# Patient Record
Sex: FEMALE | Race: WHITE | HISPANIC OR LATINO | Employment: FULL TIME | URBAN - METROPOLITAN AREA
[De-identification: names, ages, dates, MRNs, and addresses within clinical notes are randomized per-mention and may not be internally consistent; named-entity substitution may affect disease eponyms.]

---

## 2019-09-29 ENCOUNTER — HOSPITAL ENCOUNTER (EMERGENCY)
Facility: HOSPITAL | Age: 29
Discharge: HOME | End: 2019-09-29
Attending: EMERGENCY MEDICINE | Admitting: EMERGENCY MEDICINE
Payer: COMMERCIAL

## 2019-09-29 ENCOUNTER — APPOINTMENT (EMERGENCY)
Dept: ULTRASOUND IMAGING | Facility: HOSPITAL | Age: 29
End: 2019-09-29
Payer: COMMERCIAL

## 2019-09-29 VITALS
TEMPERATURE: 98.1 F | DIASTOLIC BLOOD PRESSURE: 72 MMHG | HEIGHT: 59 IN | HEART RATE: 80 BPM | SYSTOLIC BLOOD PRESSURE: 130 MMHG | RESPIRATION RATE: 16 BRPM | OXYGEN SATURATION: 99 % | BODY MASS INDEX: 22.18 KG/M2 | WEIGHT: 110 LBS

## 2019-09-29 DIAGNOSIS — R10.2 PELVIC PAIN IN FEMALE: Primary | ICD-10-CM

## 2019-09-29 LAB
BILIRUB UR QL STRIP: NEGATIVE
CLARITY UR: CLEAR
COLOR UR: YELLOW
EXT PREG TEST URINE: NEGATIVE
EXT. CONTROL ED NAV: NORMAL
GLUCOSE UR STRIP-MCNC: NEGATIVE MG/DL
HGB UR QL STRIP.AUTO: NEGATIVE
KETONES UR STRIP-MCNC: ABNORMAL MG/DL
LEUKOCYTE ESTERASE UR QL STRIP: NEGATIVE
NITRITE UR QL STRIP: NEGATIVE
PH UR STRIP.AUTO: 6.5 [PH]
PROT UR STRIP-MCNC: NEGATIVE MG/DL
SP GR UR STRIP.AUTO: 1.01 (ref 1–1.03)
UROBILINOGEN UR QL STRIP.AUTO: 0.2 E.U./DL

## 2019-09-29 PROCEDURE — 99284 EMERGENCY DEPT VISIT MOD MDM: CPT

## 2019-09-29 PROCEDURE — 81025 URINE PREGNANCY TEST: CPT | Performed by: EMERGENCY MEDICINE

## 2019-09-29 PROCEDURE — 76856 US EXAM PELVIC COMPLETE: CPT

## 2019-09-29 PROCEDURE — 76830 TRANSVAGINAL US NON-OB: CPT

## 2019-09-29 PROCEDURE — 81003 URINALYSIS AUTO W/O SCOPE: CPT | Performed by: EMERGENCY MEDICINE

## 2019-09-29 NOTE — ED NOTES
US tech called for the need of the US non OB  Pt is drinking and is pending US at this time   Pt update on their care      Liang Saldana RN  09/29/19 5773

## 2019-09-29 NOTE — ED PROVIDER NOTES
History  Chief Complaint   Patient presents with    Pelvic Pain     started four days ago, bilateral lower abdomen, aching pain      Complains of gradual onset sharp crampy constant nonradiating bilateral pelvic pain sometimes worse with activity better with rest otherwise no significant exacerbating or relieving factors over the past 4 days or so  Denies fever, rash, joint pain/swelling, headache, vision changes, hearing changes, chest pain, shortness of breath and changes in bladder habits  None       History reviewed  No pertinent past medical history  Past Surgical History:   Procedure Laterality Date    CERVICAL CONE BIOPSY      TUBAL LIGATION         History reviewed  No pertinent family history  I have reviewed and agree with the history as documented  Social History     Tobacco Use    Smoking status: Never Smoker    Smokeless tobacco: Never Used   Substance Use Topics    Alcohol use: Never     Frequency: Never    Drug use: Never        Review of Systems   All other systems reviewed and are negative  Physical Exam  Physical Exam   Constitutional: She appears well-developed and well-nourished  HENT:   Head: Normocephalic and atraumatic  Eyes: Conjunctivae and EOM are normal    Neck: Normal range of motion  Neck supple  Cardiovascular: Normal rate and regular rhythm  Pulmonary/Chest: Effort normal and breath sounds normal    Abdominal: Soft  There is no tenderness  Musculoskeletal: Normal range of motion  She exhibits no deformity  Neurological: She is alert  No focal deficit   Skin: Skin is warm and dry  Psychiatric: She has a normal mood and affect  Her behavior is normal    Nursing note and vitals reviewed        Vital Signs  ED Triage Vitals [09/29/19 1352]   Temperature Pulse Respirations Blood Pressure SpO2   98 1 °F (36 7 °C) 83 16 134/79 100 %      Temp Source Heart Rate Source Patient Position - Orthostatic VS BP Location FiO2 (%)   Temporal Monitor Sitting Left arm --      Pain Score       5           Vitals:    09/29/19 1352   BP: 134/79   Pulse: 83   Patient Position - Orthostatic VS: Sitting         Visual Acuity      ED Medications  Medications - No data to display    Diagnostic Studies  Results Reviewed     Procedure Component Value Units Date/Time    UA w Reflex to Microscopic w Reflex to Culture [[id number]]  (Abnormal) Collected:  09/29/19 1536    Lab Status:  Final result Specimen:  Urine, Clean Catch Updated:  09/29/19 1544     Color, UA Yellow     Clarity, UA Clear     Specific Gravity, UA 1 015     pH, UA 6 5     Leukocytes, UA Negative     Nitrite, UA Negative     Protein, UA Negative mg/dl      Glucose, UA Negative mg/dl      Ketones, UA 15 (1+) mg/dl      Urobilinogen, UA 0 2 E U /dl      Bilirubin, UA Negative     Blood, UA Negative    POCT pregnancy, urine [[id number]]  (Normal) Resulted:  09/29/19 1540    Lab Status:  Final result Updated:  09/29/19 1541     EXT PREG TEST UR (Ref: Negative) negative     Control valid                 US pelvis complete w transvaginal   Final Result by Aidan Malik MD (09/29 1543)       Normal study with normal flow to both ovaries  Workstation performed: [id number]                    Procedures  Procedures       ED Course                               MDM  Number of Diagnoses or Management Options  Diagnosis management comments: Presents with pelvic pain  Afebrile  VSS  Exam reveals nontoxic appearing patient in no acute distress with a benign abdomen  UA unremarkable  UCG negative  Diagnostic imaging reveals no ultrasound evidence suggesting acute pelvic process to explain the patient's symptoms  No clear etiology at this time  Consider constipation  Consider some form of late britelianne  Regardless, no red flags or high-risk features of time of this report    Okay for discharge home with instructions for gynecology follow-up and signs and symptoms that would warrant return to the emergency department  Disposition  Final diagnoses:   Pelvic pain in female     Time reflects when diagnosis was documented in both MDM as applicable and the Disposition within this note     Time User Action Codes Description Comment    9/29/2019  4:00 PM Harlo Fothergill Add [R10 2] Pelvic pain in female       ED Disposition     ED Disposition Condition Date/Time Comment    Discharge Good Sun Sep 29, 2019  4:00 PM Dagoberto Kaur discharge to home/self care  Follow-up Information     Follow up With Specialties Details Why Contact Info    A gynecologist of your choice  Call in 1 day To schedule an appointment for re-evaluation           Patient's Medications    No medications on file     No discharge procedures on file      ED Provider  Electronically Signed by           Selina Johnson DO  09/29/19 3824

## 2019-11-19 ENCOUNTER — OFFICE VISIT (OUTPATIENT)
Dept: URGENT CARE | Facility: CLINIC | Age: 29
End: 2019-11-19
Payer: COMMERCIAL

## 2019-11-19 VITALS
HEART RATE: 87 BPM | HEIGHT: 59 IN | OXYGEN SATURATION: 100 % | DIASTOLIC BLOOD PRESSURE: 79 MMHG | WEIGHT: 110 LBS | SYSTOLIC BLOOD PRESSURE: 132 MMHG | TEMPERATURE: 97.2 F | BODY MASS INDEX: 22.18 KG/M2 | RESPIRATION RATE: 16 BRPM

## 2019-11-19 DIAGNOSIS — L30.9 HAND ECZEMA: ICD-10-CM

## 2019-11-19 DIAGNOSIS — B35.9 RINGWORM: Primary | ICD-10-CM

## 2019-11-19 PROCEDURE — 99213 OFFICE O/P EST LOW 20 MIN: CPT | Performed by: PHYSICIAN ASSISTANT

## 2019-11-19 RX ORDER — NORELGESTROMIN AND ETHINYL ESTRADIOL 150; 35 UG/D; UG/D
PATCH TRANSDERMAL
Refills: 12 | COMMUNITY
Start: 2019-10-26 | End: 2020-10-06 | Stop reason: ALTCHOICE

## 2019-11-19 RX ORDER — KETOCONAZOLE 20 MG/G
CREAM TOPICAL 3 TIMES DAILY
Qty: 60 G | Refills: 1 | Status: SHIPPED | OUTPATIENT
Start: 2019-11-19 | End: 2020-10-06 | Stop reason: ALTCHOICE

## 2019-11-19 RX ORDER — TRIAMCINOLONE ACETONIDE 1 MG/G
CREAM TOPICAL 3 TIMES DAILY
Qty: 80 G | Refills: 0 | Status: SHIPPED | OUTPATIENT
Start: 2019-11-19 | End: 2020-10-06 | Stop reason: ALTCHOICE

## 2019-11-20 NOTE — PROGRESS NOTES
[facility]    NAME: Kelly Mendoza is a 34 y o  female  : 1990    MRN: [id number]  DATE: 2019  TIME: 7:43 PM    Assessment and Plan   Ringworm [B35 9]  1  Ringworm  ketoconazole (NIZORAL) 2 % cream   2  Hand eczema  triamcinolone (KENALOG) 0 1 % cream       Patient Instructions     Patient Instructions   Creams as directed  Follow up with dermatology if not improving  Chief Complaint     Chief Complaint   Patient presents with    Rash     x 6 weeks       History of Present Illness   21-year-old female here with complaint of rash on bilateral forearms and right axilla the last 6 weeks  States is very itchy  Patient has been applying over-the-counter antifungal cream with minimal relief  Also has dermatitis of her hands  States that she is in water a lot for her job  Also very dry and itchy  Review of Systems   Review of Systems   Constitutional: Negative for appetite change, chills and fever  HENT: Negative for congestion, ear discharge, ear pain, facial swelling, postnasal drip, sinus pressure, sneezing and sore throat  Respiratory: Negative for cough, shortness of breath and wheezing  Skin: Positive for rash  Neurological: Negative for headaches         Current Medications     Current Outpatient Medications:     XULANE 150-35 MCG/24HR, APPLY 1 PATCH ONE TIME PER WEEK, Disp: , Rfl: 12    ketoconazole (NIZORAL) 2 % cream, Apply topically 3 (three) times a day, Disp: 60 g, Rfl: 1    norelgestromin-ethinyl estradiol (ORTHO EVRA) 150-35 MCG/24HR, Place 1 patch on the skin, Disp: , Rfl:     triamcinolone (KENALOG) 0 1 % cream, Apply topically 3 (three) times a day, Disp: 80 g, Rfl: 0    Current Allergies     Allergies as of 2019    (No Known Allergies)          The following portions of the patient's history were reviewed and updated as appropriate: allergies, current medications, past family history, past medical history, past social history, past surgical history and problem list    History reviewed  No pertinent past medical history  Past Surgical History:   Procedure Laterality Date    CERVICAL CONE BIOPSY      TUBAL LIGATION       History reviewed  No pertinent family history  Social History     Socioeconomic History    Marital status: /Civil Union     Spouse name: Not on file    Number of children: Not on file    Years of education: Not on file    Highest education level: Not on file   Occupational History    Not on file   Social Needs    Financial resource strain: Not on file    Food insecurity:     Worry: Not on file     Inability: Not on file    Transportation needs:     Medical: Not on file     Non-medical: Not on file   Tobacco Use    Smoking status: Never Smoker    Smokeless tobacco: Never Used   Substance and Sexual Activity    Alcohol use: Never     Frequency: Never    Drug use: Never    Sexual activity: Not on file   Lifestyle    Physical activity:     Days per week: Not on file     Minutes per session: Not on file    Stress: Not on file   Relationships    Social connections:     Talks on phone: Not on file     Gets together: Not on file     Attends Restorationist service: Not on file     Active member of club or organization: Not on file     Attends meetings of clubs or organizations: Not on file     Relationship status: Not on file    Intimate partner violence:     Fear of current or ex partner: Not on file     Emotionally abused: Not on file     Physically abused: Not on file     Forced sexual activity: Not on file   Other Topics Concern    Not on file   Social History Narrative    Not on file     Medications have been verified  Objective   /79   Pulse 87   Temp (!) 97 2 °F (36 2 °C)   Resp 16   Ht 4' 11" (1 499 m)   Wt 49 9 kg (110 lb)   SpO2 100%   BMI 22 22 kg/m²      Physical Exam   Physical Exam   Constitutional: She appears well-developed and well-nourished  No distress     HENT:   Head: Normocephalic and atraumatic  Right Ear: Tympanic membrane normal    Left Ear: Tympanic membrane normal    Nose: Nose normal  No mucosal edema or rhinorrhea  Right sinus exhibits no maxillary sinus tenderness and no frontal sinus tenderness  Left sinus exhibits no maxillary sinus tenderness and no frontal sinus tenderness  Mouth/Throat: Oropharynx is clear and moist  No oropharyngeal exudate, posterior oropharyngeal edema or posterior oropharyngeal erythema  Eyes: Conjunctivae are normal    Cardiovascular: Normal rate, regular rhythm and normal heart sounds  No murmur heard  Skin: Rash (Erythematous circular rash bilateral forearms on the extensor surfaces circular in nature indurated border  Central clearing ) noted  Bilateral hands with dry erythematous rash  Nursing note and vitals reviewed

## 2020-02-11 ENCOUNTER — OFFICE VISIT (OUTPATIENT)
Dept: FAMILY MEDICINE CLINIC | Facility: CLINIC | Age: 30
End: 2020-02-11
Payer: COMMERCIAL

## 2020-02-11 VITALS
SYSTOLIC BLOOD PRESSURE: 118 MMHG | HEIGHT: 59 IN | RESPIRATION RATE: 18 BRPM | DIASTOLIC BLOOD PRESSURE: 82 MMHG | TEMPERATURE: 97.1 F | BODY MASS INDEX: 22.3 KG/M2 | HEART RATE: 96 BPM | WEIGHT: 110.6 LBS

## 2020-02-11 DIAGNOSIS — F32.0 CURRENT MILD EPISODE OF MAJOR DEPRESSIVE DISORDER WITHOUT PRIOR EPISODE (HCC): ICD-10-CM

## 2020-02-11 DIAGNOSIS — Z76.89 ENCOUNTER TO ESTABLISH CARE: Primary | ICD-10-CM

## 2020-02-11 PROCEDURE — 99203 OFFICE O/P NEW LOW 30 MIN: CPT | Performed by: FAMILY MEDICINE

## 2020-02-11 RX ORDER — ESCITALOPRAM OXALATE 10 MG/1
10 TABLET ORAL DAILY
Qty: 30 TABLET | Refills: 1 | Status: SHIPPED | OUTPATIENT
Start: 2020-02-11 | End: 2020-10-06 | Stop reason: ALTCHOICE

## 2020-02-11 RX ORDER — ESCITALOPRAM OXALATE 20 MG/1
20 TABLET ORAL DAILY
Status: CANCELLED | OUTPATIENT
Start: 2020-02-11

## 2020-04-02 DIAGNOSIS — F32.0 CURRENT MILD EPISODE OF MAJOR DEPRESSIVE DISORDER WITHOUT PRIOR EPISODE (HCC): ICD-10-CM

## 2020-04-03 RX ORDER — ESCITALOPRAM OXALATE 10 MG/1
10 TABLET ORAL DAILY
Qty: 30 TABLET | Refills: 1 | OUTPATIENT
Start: 2020-04-03

## 2020-04-09 DIAGNOSIS — F32.0 CURRENT MILD EPISODE OF MAJOR DEPRESSIVE DISORDER WITHOUT PRIOR EPISODE (HCC): ICD-10-CM

## 2020-04-09 RX ORDER — ESCITALOPRAM OXALATE 10 MG/1
10 TABLET ORAL DAILY
Qty: 30 TABLET | Refills: 1 | OUTPATIENT
Start: 2020-04-09

## 2020-04-14 ENCOUNTER — APPOINTMENT (EMERGENCY)
Dept: RADIOLOGY | Facility: HOSPITAL | Age: 30
End: 2020-04-14
Payer: COMMERCIAL

## 2020-04-14 ENCOUNTER — HOSPITAL ENCOUNTER (EMERGENCY)
Facility: HOSPITAL | Age: 30
Discharge: HOME | End: 2020-04-15
Attending: EMERGENCY MEDICINE | Admitting: EMERGENCY MEDICINE
Payer: COMMERCIAL

## 2020-04-14 VITALS
HEIGHT: 59 IN | SYSTOLIC BLOOD PRESSURE: 120 MMHG | HEART RATE: 99 BPM | DIASTOLIC BLOOD PRESSURE: 69 MMHG | RESPIRATION RATE: 18 BRPM | OXYGEN SATURATION: 97 % | BODY MASS INDEX: 23.82 KG/M2 | WEIGHT: 118.17 LBS | TEMPERATURE: 98.7 F

## 2020-04-14 DIAGNOSIS — B34.9 ACUTE VIRAL SYNDROME: Primary | ICD-10-CM

## 2020-04-14 LAB — S PYO DNA THROAT QL NAA+PROBE: NORMAL

## 2020-04-14 PROCEDURE — 87651 STREP A DNA AMP PROBE: CPT | Performed by: EMERGENCY MEDICINE

## 2020-04-14 PROCEDURE — 99284 EMERGENCY DEPT VISIT MOD MDM: CPT | Performed by: EMERGENCY MEDICINE

## 2020-04-14 PROCEDURE — 87635 SARS-COV-2 COVID-19 AMP PRB: CPT | Performed by: EMERGENCY MEDICINE

## 2020-04-14 PROCEDURE — 93005 ELECTROCARDIOGRAM TRACING: CPT

## 2020-04-14 PROCEDURE — 71045 X-RAY EXAM CHEST 1 VIEW: CPT

## 2020-04-14 PROCEDURE — 99285 EMERGENCY DEPT VISIT HI MDM: CPT

## 2020-04-15 LAB
ATRIAL RATE: 94 BPM
P AXIS: 68 DEGREES
PR INTERVAL: 160 MS
QRS AXIS: 75 DEGREES
QRSD INTERVAL: 86 MS
QT INTERVAL: 358 MS
QTC INTERVAL: 447 MS
T WAVE AXIS: 39 DEGREES
VENTRICULAR RATE: 94 BPM

## 2020-04-15 PROCEDURE — 93010 ELECTROCARDIOGRAM REPORT: CPT | Performed by: INTERNAL MEDICINE

## 2020-04-16 LAB — SARS-COV-2 RNA SPEC QL NAA+PROBE: DETECTED

## 2020-10-06 ENCOUNTER — OFFICE VISIT (OUTPATIENT)
Dept: FAMILY MEDICINE CLINIC | Facility: CLINIC | Age: 30
End: 2020-10-06
Payer: COMMERCIAL

## 2020-10-06 VITALS
TEMPERATURE: 97.6 F | WEIGHT: 114 LBS | SYSTOLIC BLOOD PRESSURE: 128 MMHG | HEIGHT: 59 IN | BODY MASS INDEX: 22.98 KG/M2 | HEART RATE: 88 BPM | DIASTOLIC BLOOD PRESSURE: 86 MMHG | OXYGEN SATURATION: 99 %

## 2020-10-06 DIAGNOSIS — L65.9 ALOPECIA: ICD-10-CM

## 2020-10-06 DIAGNOSIS — F32.1 CURRENT MODERATE EPISODE OF MAJOR DEPRESSIVE DISORDER, UNSPECIFIED WHETHER RECURRENT (HCC): Primary | ICD-10-CM

## 2020-10-06 PROCEDURE — 99213 OFFICE O/P EST LOW 20 MIN: CPT | Performed by: FAMILY MEDICINE

## 2021-04-20 ENCOUNTER — HOSPITAL ENCOUNTER (EMERGENCY)
Facility: HOSPITAL | Age: 31
Discharge: HOME | End: 2021-04-20
Attending: EMERGENCY MEDICINE
Payer: COMMERCIAL

## 2021-04-20 ENCOUNTER — APPOINTMENT (EMERGENCY)
Dept: RADIOLOGY | Facility: HOSPITAL | Age: 31
End: 2021-04-20
Payer: COMMERCIAL

## 2021-04-20 ENCOUNTER — APPOINTMENT (EMERGENCY)
Dept: CT IMAGING | Facility: HOSPITAL | Age: 31
End: 2021-04-20
Payer: COMMERCIAL

## 2021-04-20 VITALS
BODY MASS INDEX: 23.07 KG/M2 | OXYGEN SATURATION: 96 % | SYSTOLIC BLOOD PRESSURE: 110 MMHG | HEART RATE: 84 BPM | DIASTOLIC BLOOD PRESSURE: 72 MMHG | RESPIRATION RATE: 18 BRPM | TEMPERATURE: 98.2 F | WEIGHT: 114.2 LBS

## 2021-04-20 DIAGNOSIS — S09.90XA CHI (CLOSED HEAD INJURY): Primary | ICD-10-CM

## 2021-04-20 PROCEDURE — 99284 EMERGENCY DEPT VISIT MOD MDM: CPT | Performed by: EMERGENCY MEDICINE

## 2021-04-20 PROCEDURE — 71045 X-RAY EXAM CHEST 1 VIEW: CPT

## 2021-04-20 PROCEDURE — 99284 EMERGENCY DEPT VISIT MOD MDM: CPT

## 2021-04-20 PROCEDURE — 73030 X-RAY EXAM OF SHOULDER: CPT

## 2021-04-20 PROCEDURE — 70450 CT HEAD/BRAIN W/O DYE: CPT

## 2021-04-21 NOTE — ED NOTES
There was a 90lb bin that contains water in it that she was dumping water out of and when she went to dump it, it came back and hit her in the right posterior head and right shoulder  She did have dizziness at first  It happened around 17:00 this evening  She went home and is now having a headache and lightheadedness that will not go away       Abner Roche RN  04/20/21 2024

## 2021-04-21 NOTE — ED PROVIDER NOTES
History  Chief Complaint   Patient presents with    Head Injury     Patient was hit in back of head and shoulder with a "bin" at work that she states are heavy  Occured around 1700  She did not lose consciousness or fall  Now c/o R shoulder pain and headache  HPI  This is a 72-year-old female who presents for evaluation of head injury  Patient was at work tonight when she got hit the back of the head with a been at work  She now has pain in the back of her head, she has pain in her right shoulder  She denies any loss consciousness  She states that the injury happened at approximately 17:00 today  She thought that it would get better but her headache is got worse  She denies any weakness in her arms or legs, denies any vision changes  She denies any nausea vomiting  Prior to Admission Medications   Prescriptions Last Dose Informant Patient Reported? Taking?   sertraline (ZOLOFT) 50 mg tablet Not Taking at Unknown time  No No   Sig: Take 1 tablet (50 mg total) by mouth daily   Patient not taking: Reported on 4/20/2021      Facility-Administered Medications: None       Past Medical History:   Diagnosis Date    Anxiety     Depression        Past Surgical History:   Procedure Laterality Date    CERVICAL CONE BIOPSY      TUBAL LIGATION         Family History   Problem Relation Age of Onset    Multiple sclerosis Mother     Heart disease Father      I have reviewed and agree with the history as documented  E-Cigarette/Vaping     E-Cigarette/Vaping Substances     Social History     Tobacco Use    Smoking status: Never Smoker    Smokeless tobacco: Never Used   Substance Use Topics    Alcohol use: Never     Frequency: Never    Drug use: Never       Review of Systems   Constitutional: Negative  Negative for chills and fever  HENT: Negative  Negative for congestion and sore throat  Head injury   Eyes: Negative  Negative for discharge and redness  Respiratory: Negative    Negative for chest tightness and shortness of breath  Cardiovascular: Negative  Negative for chest pain and palpitations  Gastrointestinal: Negative  Negative for abdominal pain, nausea and vomiting  Endocrine: Negative  Negative for cold intolerance and polyphagia  Genitourinary: Negative  Negative for difficulty urinating and dysuria  Musculoskeletal: Negative  Negative for arthralgias and back pain  Skin: Negative  Negative for color change and wound  Allergic/Immunologic: Negative  Negative for environmental allergies  Neurological: Negative  Negative for dizziness, weakness and headaches  Hematological: Negative  Psychiatric/Behavioral: Negative  Negative for behavioral problems  The patient is not nervous/anxious  All other systems reviewed and are negative  Physical Exam  Physical Exam  Vitals signs and nursing note reviewed  Constitutional:       General: She is not in acute distress  Appearance: She is well-developed  She is not diaphoretic  HENT:      Head: Normocephalic and atraumatic  Right Ear: External ear normal       Left Ear: External ear normal       Nose: No congestion or rhinorrhea  Eyes:      General: No scleral icterus  Right eye: No discharge  Left eye: No discharge  Conjunctiva/sclera: Conjunctivae normal       Pupils: Pupils are equal, round, and reactive to light  Neck:      Musculoskeletal: Normal range of motion and neck supple  No neck rigidity or muscular tenderness  Thyroid: No thyromegaly  Trachea: No tracheal deviation  Comments: Notably patient has no midline tenderness of her CT or L-spine, no signs of meningismus, she can look up without hurting her neck she can touch her chin to her chest without hurting  She can move her head from side to side without any pain  Cardiovascular:      Rate and Rhythm: Regular rhythm  Heart sounds: No murmur  No friction rub  No gallop      Pulmonary:      Effort: Pulmonary effort is normal  No respiratory distress  Breath sounds: Normal breath sounds  No stridor  No wheezing or rales  Abdominal:      General: Bowel sounds are normal  There is no distension  Palpations: Abdomen is soft  Tenderness: There is no abdominal tenderness  There is no guarding or rebound  Musculoskeletal: Normal range of motion  General: No tenderness, deformity or signs of injury  Skin:     General: Skin is warm and dry  Findings: No erythema or rash  Neurological:      General: No focal deficit present  Mental Status: She is alert and oriented to person, place, and time  Cranial Nerves: No cranial nerve deficit  Psychiatric:         Behavior: Behavior normal          Thought Content: Thought content normal          Vital Signs  ED Triage Vitals [04/20/21 2015]   Temperature Pulse Respirations Blood Pressure SpO2   98 2 °F (36 8 °C) 87 18 140/95 99 %      Temp Source Heart Rate Source Patient Position - Orthostatic VS BP Location FiO2 (%)   Temporal Monitor Lying Right arm --      Pain Score       5           Vitals:    04/20/21 2015 04/20/21 2045 04/20/21 2115 04/20/21 2145   BP: 140/95 123/67 119/70 110/72   Pulse: 87 82 77 84   Patient Position - Orthostatic VS: Lying Sitting Sitting Lying         Visual Acuity  Visual Acuity      Most Recent Value   L Pupil Size (mm)  3   R Pupil Size (mm)  3          ED Medications  Medications - No data to display    Diagnostic Studies  Results Reviewed     None                 CT head without contrast   Final Result by Grady Gaming DO (04/20 2240)      No calvarial fracture or acute intracranial abnormality is seen  Workstation performed: [id number]         XR shoulder 2+ views RIGHT   ED Interpretation by Tharon Sandhoff, MD (04/20 0205)   No fracture dislocation      Final Result by Benton Gaucher, MD (04/21 5391)      No acute osseous abnormality              Workstation performed: [id number]         XR chest 1 view portable   ED Interpretation by Kim Cosme MD (04/20 2306)   No cardiopulmonary disease  Final Result by Lupe Reagan MD (04/21 3985)      No acute cardiopulmonary disease  Workstation performed: [id number]                    Procedures  Procedures         ED Course  ED Course as of Apr 21 1500   Tue Apr 20, 2021   2305 IMPRESSION:     No calvarial fracture or acute intracranial abnormality is seen                                    SBIRT 22yo+      Most Recent Value   SBIRT (23 yo +)   In order to provide better care to our patients, we are screening all of our patients for alcohol and drug use  Would it be okay to ask you these screening questions? Yes Filed at: 04/20/2021 2022   Initial Alcohol Screen: US AUDIT-C    1  How often do you have a drink containing alcohol?  0 Filed at: 04/20/2021 2022   2  How many drinks containing alcohol do you have on a typical day you are drinking? 0 Filed at: 04/20/2021 2022   3a  Male UNDER 65: How often do you have five or more drinks on one occasion? 0 Filed at: 04/20/2021 2022   3b  FEMALE Any Age, or MALE 65+: How often do you have 4 or more drinks on one occassion? 0 Filed at: 04/20/2021 2022   Audit-C Score  0 Filed at: 04/20/2021 2022   LAZARUS: How many times in the past year have you    Used an illegal drug or used a prescription medication for non-medical reasons? Never Filed at: 04/20/2021 2022                    MDM  Number of Diagnoses or Management Options  CHI (closed head injury):   Diagnosis management comments: 72-year-old woman comes in after head injury at work  Will get a CT of her head, will get a right shoulder x-ray and chest x-ray  Neck is cleared by nexus C-spine        Disposition  Final diagnoses:   CHI (closed head injury)     Time reflects when diagnosis was documented in both MDM as applicable and the Disposition within this note     Time User Action Codes Description Comment    4/20/2021 11:06 PM Vera Castillo Add [Q36 20JE] CHI (closed head injury)       ED Disposition     ED Disposition Condition Date/Time Comment    Discharge Stable Tue Apr 20, 2021 11:06 PM Gamal Ibrahim discharge to home/self care  Follow-up Information     Follow up With Specialties Details Why Contact Info Additional Information    Vaibhav Pack PA-C Family Medicine, Physician Assistant Call in 1 day follow up being seen in the emergency department [address] ([place] [address])       [facility] Emergency Department Emergency Medicine Go to  As needed, If symptoms worsen [place] [phone]  [facility] Emergency Department, [address], [address]          Discharge Medication List as of 4/20/2021 11:06 PM      CONTINUE these medications which have NOT CHANGED    Details   sertraline (ZOLOFT) 50 mg tablet Take 1 tablet (50 mg total) by mouth daily, Starting Tue 10/6/2020, Normal           No discharge procedures on file      PDMP Review     None          ED Provider  Electronically Signed by           Constance Brush MD  04/21/21 3803

## 2022-08-03 ENCOUNTER — TELEPHONE (OUTPATIENT)
Dept: FAMILY MEDICINE CLINIC | Facility: CLINIC | Age: 32
End: 2022-08-03

## 2024-03-14 ENCOUNTER — HOSPITAL ENCOUNTER (EMERGENCY)
Facility: HOSPITAL | Age: 34
Discharge: HOME | End: 2024-03-14
Attending: EMERGENCY MEDICINE
Payer: OTHER MISCELLANEOUS

## 2024-03-14 VITALS
RESPIRATION RATE: 18 BRPM | DIASTOLIC BLOOD PRESSURE: 95 MMHG | HEART RATE: 88 BPM | OXYGEN SATURATION: 99 % | TEMPERATURE: 98.4 F | SYSTOLIC BLOOD PRESSURE: 135 MMHG

## 2024-03-14 DIAGNOSIS — S61.012A LACERATION OF LEFT THUMB: Primary | ICD-10-CM

## 2024-03-14 PROCEDURE — 12001 RPR S/N/AX/GEN/TRNK 2.5CM/<: CPT | Performed by: EMERGENCY MEDICINE

## 2024-03-14 PROCEDURE — 99282 EMERGENCY DEPT VISIT SF MDM: CPT

## 2024-03-14 PROCEDURE — 99284 EMERGENCY DEPT VISIT MOD MDM: CPT | Performed by: EMERGENCY MEDICINE

## 2024-03-14 RX ORDER — ROPIVACAINE HYDROCHLORIDE 5 MG/ML
5 INJECTION, SOLUTION EPIDURAL; INFILTRATION; PERINEURAL ONCE
Status: COMPLETED | OUTPATIENT
Start: 2024-03-14 | End: 2024-03-14

## 2024-03-14 RX ADMIN — ROPIVACAINE HYDROCHLORIDE 5 ML: 5 INJECTION, SOLUTION EPIDURAL; INFILTRATION; PERINEURAL at 15:05

## 2024-03-14 NOTE — DISCHARGE INSTRUCTIONS
You may need occupational health followup check in with your employer  Bacitracin to wound twice daily  Ok to shower keep wound clean and dry no soaking of the wound  4 stitches out in 10 days  Aleve 2 tabs twice daily with food OR ibuprofen 200-800mg every 8 hours with food as needed for pain if not pregnant  Tylenol 650mg every 6 hours as needed for pain, fever (max 3000mg in 24 hours)   Return with any signs of wound infeciton increased redness pus drainage

## 2024-03-14 NOTE — Clinical Note
Rosangela Chacon was seen and treated in our emergency department on 3/14/2024.                Diagnosis:     Rosangela  may return to work on return date.    She may return on this date: 03/15/2024    Occupational health follow up as directed by your employer. Keep wound clean and dry. 4 stitches out in 10 days     If you have any questions or concerns, please don't hesitate to call.      Rona Borrego MD    ______________________________           _______________          _______________  Hospital Representative                              Date                                Time

## 2024-03-14 NOTE — ED PROVIDER NOTES
History  Chief Complaint   Patient presents with    Finger Laceration     Cutting meat at work, knife slipped causing laceration to left thumb       34-year-old right-hand-dominant female at work she was helping out she was taking a knife this been used to cut meat to have to clean it and then sustained a laceration to her left thumb while she was cleaning the knife bleeding is controlled with direct pressure.  They did apply hemostatic agent to the wound she denies any numbness paresthesias weakness denies any other injuries otherwise been in her usual state of health the last tetanus was 3/15/2018  PHHX anx/depression PSHX TL cervixial cone bx        Prior to Admission Medications   Prescriptions Last Dose Informant Patient Reported? Taking?   sertraline (ZOLOFT) 50 mg tablet   No No   Sig: Take 1 tablet (50 mg total) by mouth daily   Patient not taking: Reported on 4/20/2021      Facility-Administered Medications: None       Past Medical History:   Diagnosis Date    Anxiety     Depression        Past Surgical History:   Procedure Laterality Date    CERVICAL CONE BIOPSY      TUBAL LIGATION         Family History   Problem Relation Age of Onset    Multiple sclerosis Mother     Heart disease Father      I have reviewed and agree with the history as documented.    E-Cigarette/Vaping     E-Cigarette/Vaping Substances     Social History     Tobacco Use    Smoking status: Never    Smokeless tobacco: Never   Substance Use Topics    Alcohol use: Never    Drug use: Never       Review of Systems   Constitutional:  Negative for activity change.   Skin:  Positive for wound.   Neurological:  Negative for weakness and numbness.   All other systems reviewed and are negative.      Physical Exam  Physical Exam  Vitals and nursing note reviewed.   Constitutional:       General: She is not in acute distress.     Appearance: She is not ill-appearing, toxic-appearing or diaphoretic.   Musculoskeletal:      Comments: Left hand: Patient  has a laceration that is 2 cm on the nondominant webspace side of her distal thum and the distal phalanx.  She has intact less than 4 mm 2-point discrimination to the radial to the webspace and non webspace side of the side of the thumb she is able to oppose her thumb to all digits she is able to AB duct and abduct the thumb as well as make an okay sign there is no evidence of subungual hematoma.  Patient will flex and extend the thumb with the IP joint isolated.   Neurological:      General: No focal deficit present.      Mental Status: She is alert and oriented to person, place, and time.      Cranial Nerves: No cranial nerve deficit.      Sensory: No sensory deficit.      Motor: No weakness.      Coordination: Coordination normal.      Gait: Gait normal.         Vital Signs  ED Triage Vitals [03/14/24 1422]   Temperature Pulse Respirations Blood Pressure SpO2   98.4 °F (36.9 °C) 88 18 135/95 99 %      Temp Source Heart Rate Source Patient Position - Orthostatic VS BP Location FiO2 (%)   Temporal Monitor Sitting Right arm --      Pain Score       4           Vitals:    03/14/24 1422   BP: 135/95   Pulse: 88   Patient Position - Orthostatic VS: Sitting         Visual Acuity      ED Medications  Medications   ropivacaine (NAROPIN) 0.5 % injection 5 mL (5 mL Infiltration Given by Other 3/14/24 1505)       Diagnostic Studies  Results Reviewed       None                   No orders to display              Procedures  Universal Protocol:  Consent: Verbal consent obtained.  Risks and benefits: risks, benefits and alternatives were discussed  Consent given by: patient  Patient understanding: patient states understanding of the procedure being performed  Patient identity confirmed: verbally with patient and arm band  Laceration repair    Date/Time: 3/14/2024 3:00 PM    Performed by: Rona Borrego MD  Authorized by: Rona Borrego MD  Body area: upper extremity  Location details: left thumb  Laceration  length: 2 cm  Foreign bodies: no foreign bodies  Tendon involvement: none  Nerve involvement: none  Vascular damage: no  Anesthesia: digital block    Anesthesia:  Local Anesthetic: Ropivacaine 0.5%  Anesthetic total: 5 mL    Sedation:  Patient sedated: no      Wound Dehiscence:  Superficial Wound Dehiscence: simple closure      Procedure Details:  Preparation: Patient was prepped and draped in the usual sterile fashion.  Irrigation solution: saline  Irrigation method: syringe  Amount of cleaning: extensive (250 ml)  Debridement: minimal  Degree of undermining: none  Skin closure: 5-0 nylon and Ethilon  Number of sutures: 5  Technique: horizontal mattress and simple  Approximation: close  Approximation difficulty: simple  Dressing: xeroform dressing with sterile adhesive strip.               ED Course                               SBIRT 22yo+      Flowsheet Row Most Recent Value   Initial Alcohol Screen: US AUDIT-C     1. How often do you have a drink containing alcohol? 0 Filed at: 03/14/2024 1424   2. How many drinks containing alcohol do you have on a typical day you are drinking?  0 Filed at: 03/14/2024 1424   3b. FEMALE Any Age, or MALE 65+: How often do you have 4 or more drinks on one occassion? 0 Filed at: 03/14/2024 1424   Audit-C Score 0 Filed at: 03/14/2024 1424   LAZARUS: How many times in the past year have you...    Used an illegal drug or used a prescription medication for non-medical reasons? Never Filed at: 03/14/2024 1424                      Medical Decision Making  Mdm: 34-year-old female sustains a laceration to the left thumb.  She is otherwise neurovascularly intact.  Is agreeable to proceeding with laceration repair patient's tetanus is up-to-date.  We did extensively review that she should check in with her employer for any occupational health follow-up requirements.  Reviewed signs and symptoms of infection.    Risk  Prescription drug management.             Disposition  Final diagnoses:    Laceration of left thumb - 2 cm s/p 4 sutures simple repair     Time reflects when diagnosis was documented in both MDM as applicable and the Disposition within this note       Time User Action Codes Description Comment    3/14/2024  3:14 PM Rona Borrego Add [S61.012A] Laceration of left thumb     3/14/2024  3:14 PM Rona Borrego Modify [S61.012A] Laceration of left thumb 2 cm s/p 4 sutures simple repair          ED Disposition       ED Disposition   Discharge    Condition   Stable    Date/Time   Thu Mar 14, 2024 1517    Comment   Rosangela Oswaldo discharge to home/self care.                   Follow-up Information       Follow up With Specialties Details Why Contact Info Additional Information    [facility] [address] [facility] [id number] [facility], [address], [place], [id number] [phone]    [facility]ly [facility] Emergency Department Emergency Medicine  As needed wound infection suture removal [address] [facility] [id number]  [phone] Formerly [facility] Emergency Department, [address], [place], [address]            Discharge Medication List as of 3/14/2024  3:22 PM        CONTINUE these medications which have NOT CHANGED    Details   sertraline (ZOLOFT) 50 mg tablet Take 1 tablet (50 mg total) by mouth daily, Starting Tue 10/6/2020, Normal             No discharge procedures on file.    PDMP Review       None            ED Provider  Electronically Signed by             Rona Borrego MD  03/14/24 9492

## 2024-04-01 ENCOUNTER — TELEPHONE (OUTPATIENT)
Dept: FAMILY MEDICINE CLINIC | Facility: CLINIC | Age: 34
End: 2024-04-01